# Patient Record
Sex: FEMALE | Race: WHITE | Employment: FULL TIME | URBAN - METROPOLITAN AREA
[De-identification: names, ages, dates, MRNs, and addresses within clinical notes are randomized per-mention and may not be internally consistent; named-entity substitution may affect disease eponyms.]

---

## 2017-01-08 NOTE — ED PROVIDER NOTES
Patient Seen in: [facility] Emergency Department    History   Patient presents with:  Alcohol Intoxication (neurologic)  Nausea/Vomiting/Diarrhea (gastrointestinal)    Stated Complaint: etoh and vomiting    HPI    Patient is a 44-year-old female pre 100%        Physical Exam    Constitutional: appears intoxicated. HENT:   Head: Normocephalic. Right Ear: External ear normal. No hemotympanum. Left Ear: External ear normal. No hemotympanum.    Nose: Nose normal.   Mouth/Throat: Oropharynx is clear a Potassium was replaced. Emesis had stopped. Patient was able to tolerate oral fluid challenge. She was observed in the ER until able to ambulate without difficulty. Patient demonstrates steady gait and clear speech prior to discharge.   Discharge home w

## 2017-03-14 ENCOUNTER — OFFICE VISIT (OUTPATIENT)
Dept: FAMILY MEDICINE CLINIC | Facility: CLINIC | Age: 22
End: 2017-03-14

## 2017-03-14 VITALS
SYSTOLIC BLOOD PRESSURE: 128 MMHG | HEART RATE: 133 BPM | TEMPERATURE: 101 F | WEIGHT: 130 LBS | HEIGHT: 62 IN | BODY MASS INDEX: 23.92 KG/M2 | OXYGEN SATURATION: 98 % | DIASTOLIC BLOOD PRESSURE: 70 MMHG | RESPIRATION RATE: 27 BRPM

## 2017-03-14 DIAGNOSIS — J01.10 ACUTE NON-RECURRENT FRONTAL SINUSITIS: Primary | ICD-10-CM

## 2017-03-14 PROCEDURE — 99213 OFFICE O/P EST LOW 20 MIN: CPT | Performed by: PHYSICIAN ASSISTANT

## 2017-03-14 RX ORDER — DAPSONE 50 MG/G
GEL TOPICAL
Refills: 9 | COMMUNITY
Start: 2016-12-21 | End: 2020-08-27

## 2017-03-14 RX ORDER — NORETHINDRONE ACETATE AND ETHINYL ESTRADIOL 1.5-30(21)
KIT ORAL
COMMUNITY
Start: 2016-12-27 | End: 2018-04-10

## 2017-03-14 RX ORDER — FLUTICASONE PROPIONATE 50 MCG
SPRAY, SUSPENSION (ML) NASAL
Qty: 1 BOTTLE | Refills: 1 | Status: SHIPPED | OUTPATIENT
Start: 2017-03-14 | End: 2017-06-09

## 2017-03-14 RX ORDER — AMOXICILLIN AND CLAVULANATE POTASSIUM 875; 125 MG/1; MG/1
1 TABLET, FILM COATED ORAL 2 TIMES DAILY
Qty: 14 TABLET | Refills: 0 | Status: SHIPPED | OUTPATIENT
Start: 2017-03-14 | End: 2017-03-21

## 2017-03-14 NOTE — PROGRESS NOTES
CHIEF COMPLAINT:   Patient presents with:  Sinus Problem: sinus pressure, sore throat, runny nose, cough is dry and with phlegm, drainage, ear discomfort, body aches and chills x 3 dys       HPI:   Carlo Steven is a 24year old female who presents for upp /70 mmHg  Pulse 133  Temp(Src) 100.7 °F (38.2 °C) (Oral)  Resp 27  Ht 62\"  Wt 130 lb  BMI 23.77 kg/m2  SpO2 98%  GENERAL: well developed, well nourished,in no apparent distress  SKIN: no rashes,no suspicious lesions  HEAD: atraumatic, normocephalic. The sinuses are air-filled spaces within the bones of the face. They connect to the inside of the nose. Sinusitis is an inflammation of the tissue lining the sinus cavity. Sinus inflammation can occur during a cold.  It can also be due to allergies to polle · Do not use nasal rinses or irrigation during an acute sinus infection, unless told to by your health care provider. Rinsing may spread the infection to other sinuses.   · Use acetaminophen or ibuprofen to control pain, unless another pain medicine was pre

## 2017-06-09 NOTE — ED PROVIDER NOTES
Patient Seen in: [facility] Emergency Department    History   Patient presents with:  Eval-P (psychiatric)    Stated Complaint: ETOH     HPI    Patient is a 30-year-old female presenting to the ER tonight with her friend at the bedside, with a compl complaint: ETOH   Other systems are as noted in HPI. Constitutional and vital signs reviewed. All other systems reviewed and negative except as noted above. PSFH elements reviewed from today and agreed except as otherwise stated in HPI.     Physica components within normal limits   ETHYL ALCOHOL - Abnormal; Notable for the following:     Ethyl Alcohol 248 (*)     All other components within normal limits   PTT, ACTIVATED - Abnormal; Notable for the following:     PTT 24.6 (*)     All other components attending to the patient, I determined, within reasonable clinical confidence and prior to discharge, that an emergency medical condition was not or was no longer present.   There was no indication for further evaluation, treatment or admission on an emerge

## 2018-04-10 PROCEDURE — 88175 CYTOPATH C/V AUTO FLUID REDO: CPT | Performed by: OBSTETRICS & GYNECOLOGY

## 2018-04-10 PROCEDURE — 87591 N.GONORRHOEAE DNA AMP PROB: CPT | Performed by: OBSTETRICS & GYNECOLOGY

## 2018-04-10 PROCEDURE — 87491 CHLMYD TRACH DNA AMP PROBE: CPT | Performed by: OBSTETRICS & GYNECOLOGY

## 2018-04-10 PROCEDURE — 87624 HPV HI-RISK TYP POOLED RSLT: CPT | Performed by: OBSTETRICS & GYNECOLOGY

## (undated) NOTE — ED AVS SNAPSHOT
[facility] Emergency Department    [place]  [address] [facility] [address]    Phone:  [phone]    Fax:  [phone]           Nikki Sun   MRN: [id number]    Department:  [facility] Emergency Department   Date of Visit:  1/8/20 To Check ER Wait Times:  TEXT 'ERwait' to 76343      Click [email or web address]      Or call [phone]    If you have any problems with your follow-up, please call our  at [phone]    Si usted tiene algun problema con ramos sequimiento, por f I have read and understand the instructions given to me by my caregivers. [facility]        Pharmacy Address Phone Number   [address] \[facility] [place] [facility]\"" ([address]) [address] [place] [address].  [facility] & If you've recently had a stay at the Hospital you can access your discharge instructions in [employer] by going to Visits < Admission Summaries.  If you've been to the Emergency Department or your doctor's office, you can view your past visit information in My

## (undated) NOTE — ED AVS SNAPSHOT
[facility] Emergency Department    [place]  [address] [place] [address]    Phone:  [phone]    Fax:  [phone]           Kina Wilson   MRN: [id number]    Department:  [facility] Emergency Department   Date of Visit:  6/9/20 To Check ER Wait Times:  TEXT 'ERwait' to 04405      Click [email or web address]      Or call [phone]    If you have any problems with your follow-up, please call our  at [phone]    Si usted tiene algun problema con ramos sequimiento, por f I have read and understand the instructions given to me by my caregivers. [facility]        Pharmacy Address Phone Number   [address] ([address]) [place] Lauren If you've recently had a stay at the Hospital you can access your discharge instructions in [employer]IT by going to Visits < Admission Summaries.  If you've been to the Emergency Department or your doctor's office, you can view your past visit information in My

## (undated) NOTE — ED AVS SNAPSHOT
[facility] Emergency Department    [place]  [address] [place] [address]    Phone:  [phone]    Fax:  [phone]           Kadie Mary   MRN: [id number]    Department:  [facility] Emergency Department   Date of Visit:  1/8/20 IF THERE IS ANY CHANGE OR WORSENING OF YOUR CONDITION, CALL YOUR PRIMARY CARE PHYSICIAN AT ONCE OR RETURN IMMEDIATELY TO THE EMERGENCY DEPARTMENT.     If you have been prescribed any medication(s), please fill your prescription right away and begin taking t

## (undated) NOTE — MR AVS SNAPSHOT
[facility]  [address] [facility] [address] [phone]               Thank you for choosing us for your health care visit with Shakeel Hutton PA-C.   We are glad to serve you and happy to provide you with this summary of yo face. Using a vaporizer along with a menthol rub at night may also help.   · An expectorant containing guaifenesin may help thin the mucus and promote drainage from the sinuses.   · Over-the-counter decongestants may be used unless a similar medicine was pr · Symptoms not resolving within 10 days  Date Last Reviewed: 4/13/2015  © 6014-4880 The [address] [place] [address], [address] [place] [address]. All rights reserved. This information is not intended as a substitute for professional medical care.  A [email or web address]. If you've recently had a stay at the Hospital you can access your discharge instructions in [employer] by going to Visits < Admission Summaries.  If you've been to the Emergency Department or your doctor's office, you can view yo

## (undated) NOTE — ED AVS SNAPSHOT
[facility] Emergency Department    [place]  [address] [place] [address]    Phone:  [phone]    Fax:  [phone]           Valeri Clark   MRN: [id number]    Department:  [facility] Emergency Department   Date of Visit:  6/9/20 IF THERE IS ANY CHANGE OR WORSENING OF YOUR CONDITION, CALL YOUR PRIMARY CARE PHYSICIAN AT ONCE OR RETURN IMMEDIATELY TO THE EMERGENCY DEPARTMENT.     If you have been prescribed any medication(s), please fill your prescription right away and begin taking t